# Patient Record
Sex: MALE | Race: WHITE | Employment: OTHER | URBAN - METROPOLITAN AREA
[De-identification: names, ages, dates, MRNs, and addresses within clinical notes are randomized per-mention and may not be internally consistent; named-entity substitution may affect disease eponyms.]

---

## 2019-10-23 ENCOUNTER — CATARACT CONSULT (OUTPATIENT)
Dept: URBAN - METROPOLITAN AREA CLINIC 35 | Facility: CLINIC | Age: 74
End: 2019-10-23

## 2019-10-23 DIAGNOSIS — H02.831: ICD-10-CM

## 2019-10-23 DIAGNOSIS — H25.811: ICD-10-CM

## 2019-10-23 DIAGNOSIS — H02.834: ICD-10-CM

## 2019-10-23 DIAGNOSIS — H25.812: ICD-10-CM

## 2019-10-23 PROCEDURE — 92136TC INTERFEROMETRY - TECHNICAL COMPONENT

## 2019-10-23 PROCEDURE — 92015 DETERMINE REFRACTIVE STATE: CPT

## 2019-10-23 PROCEDURE — V2799PMN IMPRIMIS PRED-MOXI-NEPAF 5ML

## 2019-10-23 PROCEDURE — 92004 COMPRE OPH EXAM NEW PT 1/>: CPT

## 2019-10-23 ASSESSMENT — VISUAL ACUITY
OS_SC: J1
OD_CC: 20/40
OD_SC: J8
OS_CC: 20/60-2
OS_CC: J1
OD_BAT: 20/400
OD_SC: 20/70-1
OS_SC: 20/80-1
OS_BAT: 20/100
OD_CC: J1

## 2019-10-23 ASSESSMENT — TONOMETRY
OS_IOP_MMHG: 16
OD_IOP_MMHG: 16

## 2019-12-05 ENCOUNTER — H&P (OUTPATIENT)
Dept: URBAN - METROPOLITAN AREA CLINIC 39 | Facility: CLINIC | Age: 74
End: 2019-12-05

## 2019-12-05 ENCOUNTER — SURGERY/PROCEDURE (OUTPATIENT)
Dept: URBAN - METROPOLITAN AREA SURGERY 14 | Facility: SURGERY | Age: 74
End: 2019-12-05

## 2019-12-05 DIAGNOSIS — H25.811: ICD-10-CM

## 2019-12-05 DIAGNOSIS — H25.812: ICD-10-CM

## 2019-12-05 DIAGNOSIS — H02.831: ICD-10-CM

## 2019-12-05 DIAGNOSIS — H02.834: ICD-10-CM

## 2019-12-05 PROCEDURE — 99211T TECH SERVICE

## 2019-12-05 PROCEDURE — 66984 XCAPSL CTRC RMVL W/O ECP: CPT

## 2019-12-06 ENCOUNTER — CATARACT POST-OP 1-DAY (OUTPATIENT)
Dept: URBAN - METROPOLITAN AREA CLINIC 35 | Facility: CLINIC | Age: 74
End: 2019-12-06

## 2019-12-06 DIAGNOSIS — Z96.1: ICD-10-CM

## 2019-12-06 PROCEDURE — 99024 POSTOP FOLLOW-UP VISIT: CPT

## 2019-12-06 ASSESSMENT — TONOMETRY: OD_IOP_MMHG: 12

## 2019-12-06 ASSESSMENT — VISUAL ACUITY: OD_SC: 20/30-2

## 2019-12-12 ENCOUNTER — POST OP/EVAL OF SECOND EYE (OUTPATIENT)
Dept: URBAN - METROPOLITAN AREA SURGERY 14 | Facility: SURGERY | Age: 74
End: 2019-12-12

## 2019-12-12 ENCOUNTER — SURGERY/PROCEDURE (OUTPATIENT)
Dept: URBAN - METROPOLITAN AREA SURGERY 14 | Facility: SURGERY | Age: 74
End: 2019-12-12

## 2019-12-12 DIAGNOSIS — H02.834: ICD-10-CM

## 2019-12-12 DIAGNOSIS — H25.812: ICD-10-CM

## 2019-12-12 DIAGNOSIS — H02.831: ICD-10-CM

## 2019-12-12 DIAGNOSIS — Z96.1: ICD-10-CM

## 2019-12-12 PROCEDURE — 99213 OFFICE O/P EST LOW 20 MIN: CPT

## 2019-12-12 PROCEDURE — 66984 XCAPSL CTRC RMVL W/O ECP: CPT

## 2019-12-12 ASSESSMENT — VISUAL ACUITY
OS_BAT: 20/100 WITH MR
OS_SC: 20/80-1
OD_SC: 20/30-1

## 2019-12-12 ASSESSMENT — TONOMETRY
OD_IOP_MMHG: 12
OS_IOP_MMHG: 12

## 2019-12-13 ENCOUNTER — CATARACT POST-OP 1-DAY (OUTPATIENT)
Dept: URBAN - METROPOLITAN AREA CLINIC 39 | Facility: CLINIC | Age: 74
End: 2019-12-13

## 2019-12-13 DIAGNOSIS — Z96.1: ICD-10-CM

## 2019-12-13 PROCEDURE — 99024 POSTOP FOLLOW-UP VISIT: CPT

## 2019-12-13 ASSESSMENT — VISUAL ACUITY: OS_SC: 20/30

## 2019-12-13 ASSESSMENT — TONOMETRY: OS_IOP_MMHG: 18

## 2020-01-07 ENCOUNTER — POST-OP (OUTPATIENT)
Dept: URBAN - METROPOLITAN AREA CLINIC 35 | Facility: CLINIC | Age: 75
End: 2020-01-07

## 2020-01-07 DIAGNOSIS — Z96.1: ICD-10-CM

## 2020-01-07 PROCEDURE — 99024 POSTOP FOLLOW-UP VISIT: CPT

## 2020-01-07 ASSESSMENT — TONOMETRY
OD_IOP_MMHG: 14
OS_IOP_MMHG: 14

## 2020-01-07 ASSESSMENT — VISUAL ACUITY
OD_SC: 20/40
OS_SC: 20/25

## 2020-12-14 ENCOUNTER — ESTABLISHED COMPREHENSIVE EXAM (OUTPATIENT)
Dept: URBAN - METROPOLITAN AREA CLINIC 35 | Facility: CLINIC | Age: 75
End: 2020-12-14

## 2020-12-14 DIAGNOSIS — H26.493: ICD-10-CM

## 2020-12-14 PROCEDURE — 92014 COMPRE OPH EXAM EST PT 1/>: CPT

## 2020-12-14 PROCEDURE — 92015 DETERMINE REFRACTIVE STATE: CPT

## 2020-12-14 ASSESSMENT — VISUAL ACUITY
OD_SC: J4
OS_SC: 20/25-2
OS_SC: J4
OD_SC: 20/40-1

## 2020-12-14 ASSESSMENT — TONOMETRY
OD_IOP_MMHG: 13
OS_IOP_MMHG: 13

## 2021-12-15 ENCOUNTER — COMPREHENSIVE EXAM (OUTPATIENT)
Dept: URBAN - METROPOLITAN AREA CLINIC 35 | Facility: CLINIC | Age: 76
End: 2021-12-15

## 2021-12-15 DIAGNOSIS — H26.493: ICD-10-CM

## 2021-12-15 PROCEDURE — 92014 COMPRE OPH EXAM EST PT 1/>: CPT

## 2021-12-15 ASSESSMENT — VISUAL ACUITY
OD_BAT: 20/30 W/MR
OD_CC: J1
OS_CC: J1
OD_CC: 20/20
OS_BAT: 20/30 W/MR
OS_CC: 20/20
OD_SC: 20/40-1
OS_SC: 20/30

## 2022-02-10 NOTE — PATIENT DISCUSSION
Did finalize an rx for precision 1 sphere/toric as well in case he likes the trials I sent him out with and prefers to order them.

## 2022-02-10 NOTE — PATIENT DISCUSSION
Sent him out with trials of precision 1 sphere/toric.  We discussed the fact that these might eventually replace DACP so trying to see if he likes them.

## 2022-12-20 ENCOUNTER — COMPREHENSIVE EXAM (OUTPATIENT)
Dept: URBAN - METROPOLITAN AREA CLINIC 35 | Facility: CLINIC | Age: 77
End: 2022-12-20

## 2022-12-20 PROCEDURE — 92014 COMPRE OPH EXAM EST PT 1/>: CPT

## 2022-12-20 PROCEDURE — 92015 DETERMINE REFRACTIVE STATE: CPT

## 2022-12-20 ASSESSMENT — VISUAL ACUITY
OS_BAT: 20/40
OD_CC: J1
OD_SC: 20/60-2
OD_CC: 20/30-2
OS_CC: J1
OS_SC: 20/30-1
OD_BAT: 20/60
OS_CC: 20/20-1

## 2022-12-20 ASSESSMENT — TONOMETRY
OD_IOP_MMHG: 11
OS_IOP_MMHG: 11

## 2024-01-09 ENCOUNTER — COMPREHENSIVE EXAM (OUTPATIENT)
Dept: URBAN - METROPOLITAN AREA CLINIC 35 | Facility: CLINIC | Age: 79
End: 2024-01-09

## 2024-01-09 DIAGNOSIS — H52.7: ICD-10-CM

## 2024-01-09 DIAGNOSIS — H04.123: ICD-10-CM

## 2024-01-09 DIAGNOSIS — Z98.890: ICD-10-CM

## 2024-01-09 PROCEDURE — 92014 COMPRE OPH EXAM EST PT 1/>: CPT

## 2024-01-09 PROCEDURE — 92015 DETERMINE REFRACTIVE STATE: CPT

## 2024-01-09 ASSESSMENT — VISUAL ACUITY
OU_CC: J1
OD_CC: 20/40-1
OS_CC: 20/20-2

## 2024-01-09 ASSESSMENT — TONOMETRY
OS_IOP_MMHG: 15
OD_IOP_MMHG: 15

## 2025-01-13 ENCOUNTER — COMPREHENSIVE EXAM (OUTPATIENT)
Age: 80
End: 2025-01-13

## 2025-01-13 DIAGNOSIS — Z98.890: ICD-10-CM

## 2025-01-13 DIAGNOSIS — H52.7: ICD-10-CM

## 2025-01-13 DIAGNOSIS — H04.123: ICD-10-CM

## 2025-01-13 PROCEDURE — 99214 OFFICE O/P EST MOD 30 MIN: CPT

## 2025-01-13 PROCEDURE — 92015 DETERMINE REFRACTIVE STATE: CPT
